# Patient Record
Sex: FEMALE | URBAN - METROPOLITAN AREA
[De-identification: names, ages, dates, MRNs, and addresses within clinical notes are randomized per-mention and may not be internally consistent; named-entity substitution may affect disease eponyms.]

---

## 2019-12-06 ENCOUNTER — APPOINTMENT (RX ONLY)
Dept: URBAN - METROPOLITAN AREA CLINIC 123 | Facility: CLINIC | Age: 29
Setting detail: DERMATOLOGY
End: 2019-12-06

## 2019-12-06 DIAGNOSIS — L0390 CELLULITIS AND ABSCESS OF UNSPECIFIED SITES: ICD-10-CM

## 2019-12-06 DIAGNOSIS — L0391 CELLULITIS AND ABSCESS OF UNSPECIFIED SITES: ICD-10-CM

## 2019-12-06 PROBLEM — L02.414 CUTANEOUS ABSCESS OF LEFT UPPER LIMB: Status: ACTIVE | Noted: 2019-12-06

## 2019-12-06 PROCEDURE — ? FULL BODY SKIN EXAM - DECLINED

## 2019-12-06 PROCEDURE — ? ADDITIONAL NOTES

## 2019-12-06 PROCEDURE — ? COUNSELING

## 2019-12-06 PROCEDURE — 99201: CPT

## 2019-12-06 PROCEDURE — ? PRESCRIPTION

## 2019-12-06 ASSESSMENT — LOCATION SIMPLE DESCRIPTION DERM: LOCATION SIMPLE: LEFT FOREARM

## 2019-12-06 ASSESSMENT — LOCATION ZONE DERM: LOCATION ZONE: ARM

## 2019-12-06 ASSESSMENT — LOCATION DETAILED DESCRIPTION DERM: LOCATION DETAILED: LEFT PROXIMAL DORSAL FOREARM

## 2019-12-06 NOTE — HPI: RASH
What Type Of Note Output Would You Prefer (Optional)?: Standard Output
How Severe Is Your Rash?: mild
Is This A New Presentation, Or A Follow-Up?: Rash
Additional History: Pt states she has a history of staph and treated with antibiotics in the past.